# Patient Record
Sex: FEMALE | Race: ASIAN
[De-identification: names, ages, dates, MRNs, and addresses within clinical notes are randomized per-mention and may not be internally consistent; named-entity substitution may affect disease eponyms.]

---

## 2017-07-20 ENCOUNTER — RESULT REVIEW (OUTPATIENT)
Age: 52
End: 2017-07-20

## 2017-09-07 ENCOUNTER — APPOINTMENT (OUTPATIENT)
Dept: SURGICAL ONCOLOGY | Facility: CLINIC | Age: 52
End: 2017-09-07
Payer: COMMERCIAL

## 2017-09-07 VITALS
SYSTOLIC BLOOD PRESSURE: 149 MMHG | WEIGHT: 154 LBS | OXYGEN SATURATION: 98 % | HEART RATE: 72 BPM | HEIGHT: 63.5 IN | RESPIRATION RATE: 14 BRPM | DIASTOLIC BLOOD PRESSURE: 100 MMHG | BODY MASS INDEX: 26.95 KG/M2

## 2017-09-07 DIAGNOSIS — Z80.3 FAMILY HISTORY OF MALIGNANT NEOPLASM OF BREAST: ICD-10-CM

## 2017-09-07 DIAGNOSIS — Z86.79 PERSONAL HISTORY OF OTHER DISEASES OF THE CIRCULATORY SYSTEM: ICD-10-CM

## 2017-09-07 PROBLEM — Z00.00 ENCOUNTER FOR PREVENTIVE HEALTH EXAMINATION: Status: ACTIVE | Noted: 2017-09-07

## 2017-09-07 PROCEDURE — 99245 OFF/OP CONSLTJ NEW/EST HI 55: CPT

## 2017-09-07 RX ORDER — LEVOTHYROXINE SODIUM 88 UG/1
88 TABLET ORAL
Refills: 0 | Status: ACTIVE | COMMUNITY

## 2017-09-07 RX ORDER — CLOTRIMAZOLE AND BETAMETHASONE DIPROPIONATE 10; .5 MG/G; MG/G
1-0.05 CREAM TOPICAL
Qty: 90 | Refills: 0 | Status: ACTIVE | COMMUNITY
Start: 2017-05-10

## 2017-09-07 RX ORDER — MULTIVITAMIN
TABLET ORAL
Refills: 0 | Status: ACTIVE | COMMUNITY

## 2017-09-07 RX ORDER — AMLODIPINE BESYLATE 5 MG/1
5 TABLET ORAL
Refills: 0 | Status: ACTIVE | COMMUNITY

## 2017-09-07 RX ORDER — AMLODIPINE BESYLATE 5 MG/1
5 TABLET ORAL
Qty: 90 | Refills: 0 | Status: ACTIVE | COMMUNITY
Start: 2017-03-31

## 2017-09-07 RX ORDER — LEVOTHYROXINE SODIUM 88 UG/1
88 TABLET ORAL
Qty: 30 | Refills: 0 | Status: ACTIVE | COMMUNITY
Start: 2017-07-13

## 2017-09-12 ENCOUNTER — OUTPATIENT (OUTPATIENT)
Dept: OUTPATIENT SERVICES | Facility: HOSPITAL | Age: 52
LOS: 1 days | End: 2017-09-12

## 2017-09-12 DIAGNOSIS — Z00.8 ENCOUNTER FOR OTHER GENERAL EXAMINATION: ICD-10-CM

## 2017-09-19 ENCOUNTER — FORM ENCOUNTER (OUTPATIENT)
Age: 52
End: 2017-09-19

## 2017-09-20 ENCOUNTER — OUTPATIENT (OUTPATIENT)
Dept: OUTPATIENT SERVICES | Facility: HOSPITAL | Age: 52
LOS: 1 days | End: 2017-09-20
Payer: SELF-PAY

## 2017-09-20 ENCOUNTER — OUTPATIENT (OUTPATIENT)
Dept: OUTPATIENT SERVICES | Facility: HOSPITAL | Age: 52
LOS: 1 days | End: 2017-09-20
Payer: COMMERCIAL

## 2017-09-20 VITALS
DIASTOLIC BLOOD PRESSURE: 94 MMHG | TEMPERATURE: 99 F | RESPIRATION RATE: 16 BRPM | HEIGHT: 63.5 IN | HEART RATE: 65 BPM | SYSTOLIC BLOOD PRESSURE: 140 MMHG | WEIGHT: 154.1 LBS

## 2017-09-20 DIAGNOSIS — I10 ESSENTIAL (PRIMARY) HYPERTENSION: ICD-10-CM

## 2017-09-20 DIAGNOSIS — E04.1 NONTOXIC SINGLE THYROID NODULE: ICD-10-CM

## 2017-09-20 DIAGNOSIS — E07.9 DISORDER OF THYROID, UNSPECIFIED: Chronic | ICD-10-CM

## 2017-09-20 DIAGNOSIS — E07.9 DISORDER OF THYROID, UNSPECIFIED: ICD-10-CM

## 2017-09-20 DIAGNOSIS — N63 UNSPECIFIED LUMP IN BREAST: Chronic | ICD-10-CM

## 2017-09-20 DIAGNOSIS — R06.83 SNORING: ICD-10-CM

## 2017-09-20 DIAGNOSIS — C73 MALIGNANT NEOPLASM OF THYROID GLAND: ICD-10-CM

## 2017-09-20 LAB
BUN SERPL-MCNC: 10 MG/DL — SIGNIFICANT CHANGE UP (ref 7–23)
CALCIUM SERPL-MCNC: 8.8 MG/DL — SIGNIFICANT CHANGE UP (ref 8.4–10.5)
CHLORIDE SERPL-SCNC: 101 MMOL/L — SIGNIFICANT CHANGE UP (ref 98–107)
CO2 SERPL-SCNC: 25 MMOL/L — SIGNIFICANT CHANGE UP (ref 22–31)
CREAT SERPL-MCNC: 0.7 MG/DL — SIGNIFICANT CHANGE UP (ref 0.5–1.3)
GLUCOSE SERPL-MCNC: 98 MG/DL — SIGNIFICANT CHANGE UP (ref 70–99)
HCT VFR BLD CALC: 41.3 % — SIGNIFICANT CHANGE UP (ref 34.5–45)
HGB BLD-MCNC: 13.8 G/DL — SIGNIFICANT CHANGE UP (ref 11.5–15.5)
MCHC RBC-ENTMCNC: 29.4 PG — SIGNIFICANT CHANGE UP (ref 27–34)
MCHC RBC-ENTMCNC: 33.4 % — SIGNIFICANT CHANGE UP (ref 32–36)
MCV RBC AUTO: 88.1 FL — SIGNIFICANT CHANGE UP (ref 80–100)
NRBC # FLD: 0 — SIGNIFICANT CHANGE UP
PLATELET # BLD AUTO: 177 K/UL — SIGNIFICANT CHANGE UP (ref 150–400)
PMV BLD: 10 FL — SIGNIFICANT CHANGE UP (ref 7–13)
POTASSIUM SERPL-MCNC: 4 MMOL/L — SIGNIFICANT CHANGE UP (ref 3.5–5.3)
POTASSIUM SERPL-SCNC: 4 MMOL/L — SIGNIFICANT CHANGE UP (ref 3.5–5.3)
RBC # BLD: 4.69 M/UL — SIGNIFICANT CHANGE UP (ref 3.8–5.2)
RBC # FLD: 12.7 % — SIGNIFICANT CHANGE UP (ref 10.3–14.5)
SODIUM SERPL-SCNC: 137 MMOL/L — SIGNIFICANT CHANGE UP (ref 135–145)
WBC # BLD: 6.01 K/UL — SIGNIFICANT CHANGE UP (ref 3.8–10.5)
WBC # FLD AUTO: 6.01 K/UL — SIGNIFICANT CHANGE UP (ref 3.8–10.5)

## 2017-09-20 PROCEDURE — 88321 CONSLTJ&REPRT SLD PREP ELSWR: CPT

## 2017-09-20 PROCEDURE — 93010 ELECTROCARDIOGRAM REPORT: CPT

## 2017-09-20 PROCEDURE — 71020: CPT | Mod: 26

## 2017-09-20 NOTE — H&P PST ADULT - SOURCE OF INFORMATION, PROFILE
cell [phone]; home [phone]; authorized spouse, Francisco [phone] to receive information/patient

## 2017-09-20 NOTE — H&P PST ADULT - CARDIOVASCULAR COMMENTS
c/o palpitations as recent as this week -- to see pcp for evaluation c/o lightheadedness as recent as this am; c/o palpitations as recent as this week -- to see pcp for evaluation

## 2017-09-20 NOTE — H&P PST ADULT - PROBLEM SELECTOR PLAN 1
This is a  51 y/o female who is scheduled for left thyroid lobectomy, possible total thyroidectomy on 89-29-17  * Given scrub cleanser  * Given pre op Famotidine  * Instructed to take normal am dose of synthroid the am of surgery This is a  51 y/o female who is scheduled for left thyroid lobectomy, possible total thyroidectomy on 9-29-17  * Given scrub cleanser  * Given pre op Famotidine  * Instructed to take normal am dose of synthroid the am of surgery

## 2017-09-20 NOTE — H&P PST ADULT - HISTORY OF PRESENT ILLNESS
This is a 51 y/o female who presents with h/o known left thyroid nodule for many years. Had two prior negative biopsies in the last 10 years. Had recent biopsy revealed "suspicious cells." Intervention recommended. Scheduled for left thyroid lobectomy, possible  total thyroidectomy on 9-29-17 This is a 53 y/o female who presents with h/o known left thyroid nodule for many years. Had two prior negative biopsies in the last 10 years. Had recent biopsy revealed "suspicious cells." Intervention recommended. Scheduled for left thyroid lobectomy, possible  total thyroidectomy on 9-29-17  (NOTE: Patient has h/o hyperthyroidism and known left thyroid nodule. Has received radioactive iodine. Post therapy, developed hypothyroidism).

## 2017-09-20 NOTE — H&P PST ADULT - PROBLEM SELECTOR PLAN 3
Await medical clearance from pcp due to elevated BP at PAST office despite taking antihypertensives   * Need to notify surgeon of pre op medical clearnace Await medical clearance from pcp due to elevated BP at PAST office despite taking antihypertensives and recent c/o lightheadedness as recent as this am  * Need to notify surgeon of pre op medical clearnace Await medical clearance from pcp due to elevated BP at PAST office despite taking antihypertensives and recent c/o lightheadedness as recent as this am  * Need to notify surgeon of pre op medical clearance--spoke to Jaren in surgeon's office Await medical clearance from pcp due to elevated BP at PAST office despite taking antihypertensives and recent c/o lightheadedness as recent as this am, and recent palpitations as recent as this week.  * Need to notify surgeon of pre op medical clearance--spoke to Jaren in surgeon's office

## 2017-09-20 NOTE — H&P PST ADULT - MAMMOGRAM, RESULTS OF LAST, PROFILE
has right breast marker; recent letter states she needs to follow up has right breast marker; As per patient, she received recent letter from MD that states she needs to follow up

## 2017-09-20 NOTE — H&P PST ADULT - PSH
Breast mass, right  marker in place  Thyroid mass  biopsy -- 'suspicious cells' in August/Sept 2017 and two negative biopsies within last 10 years

## 2017-09-20 NOTE — H&P PST ADULT - NSANTHOSAYNRD_GEN_A_CORE
No. MAURICIO screening performed.  STOP BANG Legend: 0-2 = LOW Risk; 3-4 = INTERMEDIATE Risk; 5-8 = HIGH Risk

## 2017-09-20 NOTE — H&P PST ADULT - PMH
Hypertension    Hyperthyroidism  had received radioactive iodine. s/p developed hypothyroidism-- NOTE: left thyroid mass present at this time with negative biopsies  Thyroid mass  since approximately 2007 -- had two prior negative biopsies. Last biopsy in Aug/Sept 2017 "suspicious cells" Breast mass, right  marker in place  Hypertension    Hyperthyroidism  had received radioactive iodine. s/p developed hypothyroidism-- NOTE: left thyroid mass present at this time with negative biopsies  Snoring  MAURICIO precautions -- responds affirmatively to STOP BANG questionnaire -- admits to loud snoring; age > 50; h/o htn  Thyroid mass  since approximately 2007 -- had two prior negative biopsies. Last biopsy in Aug/Sept 2017 "suspicious cells"  Varicose veins  bilateral

## 2017-09-29 ENCOUNTER — OUTPATIENT (OUTPATIENT)
Dept: OUTPATIENT SERVICES | Facility: HOSPITAL | Age: 52
LOS: 1 days | Discharge: HOME | End: 2017-09-29
Payer: COMMERCIAL

## 2017-09-29 ENCOUNTER — RESULT REVIEW (OUTPATIENT)
Age: 52
End: 2017-09-29

## 2017-09-29 ENCOUNTER — APPOINTMENT (OUTPATIENT)
Dept: SURGICAL ONCOLOGY | Facility: HOSPITAL | Age: 52
End: 2017-09-29

## 2017-09-29 VITALS
RESPIRATION RATE: 16 BRPM | HEART RATE: 78 BPM | OXYGEN SATURATION: 98 % | DIASTOLIC BLOOD PRESSURE: 94 MMHG | WEIGHT: 154.1 LBS | SYSTOLIC BLOOD PRESSURE: 161 MMHG | HEIGHT: 63 IN | TEMPERATURE: 98 F

## 2017-09-29 VITALS
OXYGEN SATURATION: 100 % | HEART RATE: 79 BPM | DIASTOLIC BLOOD PRESSURE: 62 MMHG | SYSTOLIC BLOOD PRESSURE: 122 MMHG | RESPIRATION RATE: 16 BRPM

## 2017-09-29 DIAGNOSIS — E04.1 NONTOXIC SINGLE THYROID NODULE: ICD-10-CM

## 2017-09-29 DIAGNOSIS — E07.9 DISORDER OF THYROID, UNSPECIFIED: Chronic | ICD-10-CM

## 2017-09-29 DIAGNOSIS — N63 UNSPECIFIED LUMP IN BREAST: Chronic | ICD-10-CM

## 2017-09-29 LAB — HCG UR QL: NEGATIVE — SIGNIFICANT CHANGE UP

## 2017-09-29 PROCEDURE — 60220 PARTIAL REMOVAL OF THYROID: CPT

## 2017-09-29 PROCEDURE — 88307 TISSUE EXAM BY PATHOLOGIST: CPT | Mod: 26

## 2017-09-29 PROCEDURE — 88331 PATH CONSLTJ SURG 1 BLK 1SPC: CPT | Mod: 26

## 2017-09-29 RX ORDER — FENTANYL CITRATE 50 UG/ML
25 INJECTION INTRAVENOUS
Qty: 0 | Refills: 0 | Status: DISCONTINUED | OUTPATIENT
Start: 2017-09-29 | End: 2017-09-29

## 2017-09-29 RX ORDER — ONDANSETRON 8 MG/1
4 TABLET, FILM COATED ORAL ONCE
Qty: 0 | Refills: 0 | Status: DISCONTINUED | OUTPATIENT
Start: 2017-09-29 | End: 2017-10-14

## 2017-09-29 RX ORDER — SODIUM CHLORIDE 9 MG/ML
1000 INJECTION, SOLUTION INTRAVENOUS
Qty: 0 | Refills: 0 | Status: DISCONTINUED | OUTPATIENT
Start: 2017-09-29 | End: 2017-10-14

## 2017-09-29 RX ORDER — SODIUM CHLORIDE 9 MG/ML
1000 INJECTION, SOLUTION INTRAVENOUS
Qty: 0 | Refills: 0 | Status: DISCONTINUED | OUTPATIENT
Start: 2017-09-29 | End: 2017-09-29

## 2017-09-29 RX ORDER — LEVOTHYROXINE SODIUM 125 MCG
1 TABLET ORAL
Qty: 0 | Refills: 0 | COMMUNITY

## 2017-09-29 RX ORDER — FENTANYL CITRATE 50 UG/ML
50 INJECTION INTRAVENOUS
Qty: 0 | Refills: 0 | Status: DISCONTINUED | OUTPATIENT
Start: 2017-09-29 | End: 2017-09-29

## 2017-09-29 RX ORDER — AMLODIPINE BESYLATE 2.5 MG/1
1 TABLET ORAL
Qty: 0 | Refills: 0 | COMMUNITY

## 2017-09-29 NOTE — ASU DISCHARGE PLAN (ADULT/PEDIATRIC). - MEDICATION SUMMARY - MEDICATIONS TO TAKE
I will START or STAY ON the medications listed below when I get home from the hospital:    amLODIPine 5 mg oral tablet  -- 1 tab(s) by mouth once a day at night  -- Indication: For BP    Synthroid 88 mcg (0.088 mg) oral tablet  -- 1 tab(s) by mouth once a day in the morning  -- Indication: For thyroid

## 2017-09-29 NOTE — PROGRESS NOTE ADULT - SUBJECTIVE AND OBJECTIVE BOX
Surgery Pre-op Note    History  Ms. Meng is a 59F with a history of a left lobe thyroid nodule, first identified 10 years ago, who has previously had 2 negative biopsies. She presents after a recent biopsy that showed suspicious cells, with both follicular and papillary features.     Vital Signs Last 24 Hrs  T(C): 36.9 (29 Sep 2017 06:56), Max: 36.9 (29 Sep 2017 06:56)  T(F): 98.5 (29 Sep 2017 06:56), Max: 98.5 (29 Sep 2017 06:56)  HR: 78 (29 Sep 2017 06:56) (78 - 78)  BP: 161/94 (29 Sep 2017 06:56) (161/94 - 161/94)  BP(mean): --  RR: 16 (29 Sep 2017 06:56) (16 - 16)  SpO2: 98% (29 Sep 2017 06:56) (98% - 98%)      Assessment:  59F with a known left lobe thyroid nodule measuring 1.9cm, with recent FNA showing suspicious cells, here for left thyroid lobectomy with possible total thyroidectomy.    Plan:  - Left partial thyroidectomy  - Intra-op frozen section  - Possible total thyroidectomy

## 2017-09-29 NOTE — ASU DISCHARGE PLAN (ADULT/PEDIATRIC). - NOTIFY
Numbness, tingling/Increased Irritability or Sluggishness/Unable to Urinate/Fever greater than 101/Inability to Tolerate Liquids or Foods/Excessive Diarrhea/Persistent Nausea and Vomiting/Pain not relieved by Medications/Bleeding that does not stop/Swelling that continues/Numbness, color, or temperature change to extremity

## 2017-09-29 NOTE — ASU DISCHARGE PLAN (ADULT/PEDIATRIC). - ***IN THE EVENT THAT YOU DEVELOP A COMPLICATION AND YOU ARE UNABLE TO REACH YOUR OWN PHYSICIAN, YOU MAY CONTACT:
at bedside to try for labs  Attempted 3 times but was unsuccessful  Dr Rubio Memory made aware        Akash Burnett, RN  04/18/17 0620
Unable to obtain any labs from pt after 6 attempts  Made Dr Hesham Duong aware and requested help for a femoral stick or central line  Called lab to see if  can attempt straight stick        Domo Bliss RN  04/18/17 [id number]
Updates called to Lakia Kirkland, RN in ICU       Wolfgang Xavier RN  04/18/17 [id number]
Statement Selected

## 2017-09-29 NOTE — BRIEF OPERATIVE NOTE - PROCEDURE
<<-----Click on this checkbox to enter Procedure Thyroid lobectomy, left  09/29/2017    Active  MBEG

## 2017-09-30 ENCOUNTER — INPATIENT (INPATIENT)
Facility: HOSPITAL | Age: 52
LOS: 2 days | Discharge: HOME | DRG: 909 | End: 2017-10-03
Attending: SURGERY | Admitting: SURGERY
Payer: COMMERCIAL

## 2017-09-30 ENCOUNTER — TRANSCRIPTION ENCOUNTER (OUTPATIENT)
Age: 52
End: 2017-09-30

## 2017-09-30 VITALS
RESPIRATION RATE: 16 BRPM | TEMPERATURE: 98 F | SYSTOLIC BLOOD PRESSURE: 165 MMHG | OXYGEN SATURATION: 97 % | HEART RATE: 74 BPM | DIASTOLIC BLOOD PRESSURE: 92 MMHG | WEIGHT: 149.91 LBS | HEIGHT: 63 IN

## 2017-09-30 DIAGNOSIS — M54.2 CERVICALGIA: ICD-10-CM

## 2017-09-30 DIAGNOSIS — N63 UNSPECIFIED LUMP IN BREAST: Chronic | ICD-10-CM

## 2017-09-30 DIAGNOSIS — E07.9 DISORDER OF THYROID, UNSPECIFIED: Chronic | ICD-10-CM

## 2017-09-30 LAB
ALBUMIN SERPL ELPH-MCNC: 4.4 G/DL — SIGNIFICANT CHANGE UP (ref 3.3–5.2)
ALP SERPL-CCNC: 68 U/L — SIGNIFICANT CHANGE UP (ref 40–120)
ALT FLD-CCNC: 10 U/L — SIGNIFICANT CHANGE UP
ANION GAP SERPL CALC-SCNC: 13 MMOL/L — SIGNIFICANT CHANGE UP (ref 5–17)
AST SERPL-CCNC: 13 U/L — SIGNIFICANT CHANGE UP
BILIRUB SERPL-MCNC: 0.4 MG/DL — SIGNIFICANT CHANGE UP (ref 0.4–2)
BLD GP AB SCN SERPL QL: SIGNIFICANT CHANGE UP
BUN SERPL-MCNC: 14 MG/DL — SIGNIFICANT CHANGE UP (ref 8–20)
CALCIUM SERPL-MCNC: 8.8 MG/DL — SIGNIFICANT CHANGE UP (ref 8.6–10.2)
CHLORIDE SERPL-SCNC: 101 MMOL/L — SIGNIFICANT CHANGE UP (ref 98–107)
CO2 SERPL-SCNC: 25 MMOL/L — SIGNIFICANT CHANGE UP (ref 22–29)
CREAT SERPL-MCNC: 0.66 MG/DL — SIGNIFICANT CHANGE UP (ref 0.5–1.3)
GLUCOSE SERPL-MCNC: 107 MG/DL — SIGNIFICANT CHANGE UP (ref 70–115)
HCT VFR BLD CALC: 39.9 % — SIGNIFICANT CHANGE UP (ref 37–47)
HGB BLD-MCNC: 13.7 G/DL — SIGNIFICANT CHANGE UP (ref 12–16)
LACTATE BLDV-MCNC: 1.2 MMOL/L — SIGNIFICANT CHANGE UP (ref 0.5–2)
MCHC RBC-ENTMCNC: 30.2 PG — SIGNIFICANT CHANGE UP (ref 27–31)
MCHC RBC-ENTMCNC: 34.3 G/DL — SIGNIFICANT CHANGE UP (ref 32–36)
MCV RBC AUTO: 88.1 FL — SIGNIFICANT CHANGE UP (ref 81–99)
PLATELET # BLD AUTO: 217 K/UL — SIGNIFICANT CHANGE UP (ref 150–400)
POTASSIUM SERPL-MCNC: 3.9 MMOL/L — SIGNIFICANT CHANGE UP (ref 3.5–5.3)
POTASSIUM SERPL-SCNC: 3.9 MMOL/L — SIGNIFICANT CHANGE UP (ref 3.5–5.3)
PROT SERPL-MCNC: 7.9 G/DL — SIGNIFICANT CHANGE UP (ref 6.6–8.7)
RBC # BLD: 4.53 M/UL — SIGNIFICANT CHANGE UP (ref 4.4–5.2)
RBC # FLD: 13.3 % — SIGNIFICANT CHANGE UP (ref 11–15.6)
SODIUM SERPL-SCNC: 139 MMOL/L — SIGNIFICANT CHANGE UP (ref 135–145)
TYPE + AB SCN PNL BLD: SIGNIFICANT CHANGE UP
WBC # BLD: 11.5 K/UL — HIGH (ref 4.8–10.8)
WBC # FLD AUTO: 11.5 K/UL — HIGH (ref 4.8–10.8)

## 2017-09-30 PROCEDURE — 99285 EMERGENCY DEPT VISIT HI MDM: CPT

## 2017-09-30 PROCEDURE — 93010 ELECTROCARDIOGRAM REPORT: CPT

## 2017-09-30 PROCEDURE — 21501 I&D DP ABSC/HMTMA SFT TS NCK: CPT

## 2017-09-30 PROCEDURE — 35800 EXPLORE NECK VESSELS: CPT | Mod: 59

## 2017-09-30 PROCEDURE — 70491 CT SOFT TISSUE NECK W/DYE: CPT | Mod: 26

## 2017-09-30 RX ORDER — LEVOTHYROXINE SODIUM 125 MCG
44 TABLET ORAL DAILY
Qty: 0 | Refills: 0 | Status: DISCONTINUED | OUTPATIENT
Start: 2017-09-30 | End: 2017-10-01

## 2017-09-30 RX ORDER — HYDROMORPHONE HYDROCHLORIDE 2 MG/ML
0.5 INJECTION INTRAMUSCULAR; INTRAVENOUS; SUBCUTANEOUS EVERY 4 HOURS
Qty: 0 | Refills: 0 | Status: DISCONTINUED | OUTPATIENT
Start: 2017-09-30 | End: 2017-10-01

## 2017-09-30 RX ORDER — DEXAMETHASONE 0.5 MG/5ML
10 ELIXIR ORAL ONCE
Qty: 0 | Refills: 0 | Status: COMPLETED | OUTPATIENT
Start: 2017-09-30 | End: 2017-09-30

## 2017-09-30 RX ORDER — SODIUM CHLORIDE 9 MG/ML
1000 INJECTION, SOLUTION INTRAVENOUS
Qty: 0 | Refills: 0 | Status: DISCONTINUED | OUTPATIENT
Start: 2017-09-30 | End: 2017-10-01

## 2017-09-30 RX ORDER — SODIUM CHLORIDE 9 MG/ML
1000 INJECTION, SOLUTION INTRAVENOUS
Qty: 0 | Refills: 0 | Status: DISCONTINUED | OUTPATIENT
Start: 2017-09-30 | End: 2017-10-02

## 2017-09-30 RX ORDER — VANCOMYCIN HCL 1 G
1000 VIAL (EA) INTRAVENOUS ONCE
Qty: 0 | Refills: 0 | Status: DISCONTINUED | OUTPATIENT
Start: 2017-09-30 | End: 2017-10-01

## 2017-09-30 RX ORDER — ONDANSETRON 8 MG/1
4 TABLET, FILM COATED ORAL ONCE
Qty: 0 | Refills: 0 | Status: DISCONTINUED | OUTPATIENT
Start: 2017-09-30 | End: 2017-10-01

## 2017-09-30 RX ORDER — ACETAMINOPHEN 500 MG
650 TABLET ORAL ONCE
Qty: 0 | Refills: 0 | Status: COMPLETED | OUTPATIENT
Start: 2017-09-30 | End: 2017-09-30

## 2017-09-30 RX ORDER — SODIUM CHLORIDE 9 MG/ML
1000 INJECTION INTRAMUSCULAR; INTRAVENOUS; SUBCUTANEOUS ONCE
Qty: 0 | Refills: 0 | Status: COMPLETED | OUTPATIENT
Start: 2017-09-30 | End: 2017-09-30

## 2017-09-30 RX ORDER — FENTANYL CITRATE 50 UG/ML
25 INJECTION INTRAVENOUS
Qty: 0 | Refills: 0 | Status: DISCONTINUED | OUTPATIENT
Start: 2017-09-30 | End: 2017-10-01

## 2017-09-30 RX ORDER — PIPERACILLIN AND TAZOBACTAM 4; .5 G/20ML; G/20ML
3.38 INJECTION, POWDER, LYOPHILIZED, FOR SOLUTION INTRAVENOUS ONCE
Qty: 0 | Refills: 0 | Status: COMPLETED | OUTPATIENT
Start: 2017-09-30 | End: 2017-09-30

## 2017-09-30 RX ADMIN — Medication 650 MILLIGRAM(S): at 17:56

## 2017-09-30 RX ADMIN — SODIUM CHLORIDE 1000 MILLILITER(S): 9 INJECTION INTRAMUSCULAR; INTRAVENOUS; SUBCUTANEOUS at 16:40

## 2017-09-30 RX ADMIN — Medication 10 MILLIGRAM(S): at 16:40

## 2017-09-30 RX ADMIN — SODIUM CHLORIDE 100 MILLILITER(S): 9 INJECTION, SOLUTION INTRAVENOUS at 22:45

## 2017-09-30 RX ADMIN — PIPERACILLIN AND TAZOBACTAM 200 GRAM(S): 4; .5 INJECTION, POWDER, LYOPHILIZED, FOR SOLUTION INTRAVENOUS at 18:37

## 2017-09-30 NOTE — H&P ADULT - ASSESSMENT
53 y/o F s/p left thyroidectomy POD 1 at [facility] for thyroid neoplasm with hematoma and active bleeding   - Admit to ACS  - NPO, IVF  - Pre-op labs type and screen  - OR for neck exploration  - Pain control

## 2017-09-30 NOTE — ED ADULT NURSE NOTE - EENT WDL
Eyes with no visual disturbances.  Ears clean and dry and no hearing difficulties. Nose with pink mucosa and no drainage.  Mouth mucous membranes moist and pink. s/p thyroidectomy, well healing wound

## 2017-09-30 NOTE — ED ADULT NURSE REASSESSMENT NOTE - NS ED NURSE REASSESS COMMENT FT1
Report given to OR AQUILES rowley pt made aware of plan of care. Please refer to flowsheet for last set of vitals

## 2017-09-30 NOTE — ED PROVIDER NOTE - MEDICAL DECISION MAKING DETAILS
Pt s/p left thyroidectomy yesterday presents with post op pain and some numbness. Pain much improved. Will check labs. Pt s/p left thyroidectomy yesterday presents with post op pain and some numbness. Pain much improved. Will check labs and call her surgeon.

## 2017-09-30 NOTE — BRIEF OPERATIVE NOTE - PROCEDURE
<<-----Click on this checkbox to enter Procedure Evacuation of hematoma of head and neck  09/30/2017  with washout  Active  LIUDMILA Evacuation of hematoma of head and neck  09/30/2017  with washout and drain placement  Active  Shama Euceda

## 2017-09-30 NOTE — ED PROVIDER NOTE - PROGRESS NOTE DETAILS
Received call from [facility] that pt has post op abscesses. Started antibiotics. Pt has increased fullness in her neck. Placed multiple calls to her surgeon Dr. Humberto Skelton, awaiting call back from Dr. De Jesus who is covering. Placed call to cardiothoracic surgery as well as ENT. Spoke to Dr. Vieyra ENT who reviewed the CT and thought likely she has a hematoma with active extravasation. The pockets of air could be esophageal injury vs post op changes? Spoke to general surgery Dr. Cowart who is evaluating pt at bedside. Pt taken to OR. 1600 pt complaints of increasing pain. Will get CT of neck with contrast.  IV decadron given.

## 2017-09-30 NOTE — CONSULT NOTE ADULT - PROBLEM SELECTOR RECOMMENDATION 9
to OR with ACS for urgent evacuation, exploration, hemostasis  may need prophylactic intubation prior to OR for worsening respiratory distress  D/w ACS attending, states no need for assistance from CT surgery at this time  Please reconsult CTS if needed

## 2017-09-30 NOTE — ED PROVIDER NOTE - OBJECTIVE STATEMENT
Pt s/p left thyroid lobectomy yesterday comes to the ED with complaints of pain on left side of neck and numbness to the left side of the lips that has resolved. no fever. No difficulty swallowing. She has hx of left thyroid nodule for many years that had 2 prior negative biopsies. The most recent one showed suspicious cells. Also hx of hyperthyroidism who received radioactive iodine and then developed hypothyroidism and takes synthroid.  Denies fever or change in vision. Pt s/p left thyroid lobectomy yesterday comes to the ED with complaints of pain on left side of neck and numbness to the left side of the lips that has resolved after she took tylenol no fever. No difficulty swallowing. She has hx of left thyroid nodule for many years that had 2 prior negative biopsies. The most recent one showed suspicious cells. Also hx of hyperthyroidism who received radioactive iodine and then developed hypothyroidism and takes synthroid.  Denies fever or change in vision. Pt s/p left thyroid lobectomy yesterday comes to the ED with complaints of pain on left side of neck and numbness to the left side of the lips that has resolved after she took tylenol no fever. No difficulty swallowing. She has hx of left thyroid nodule for many years that had 2 prior negative biopsies. The most recent one showed suspicious cells resulting in her left thyroidectomy. Also hx of hyperthyroidism who received radioactive iodine and then developed hypothyroidism and takes synthroid.  Denies fever or change in vision. No difficulty swollowing.

## 2017-09-30 NOTE — ED PROVIDER NOTE - NS ED ROS FT
Review of Systems:  	•	CONSTITUTIONAL : no fever or weight change  	•	SKIN : steri strips on neck  	•	HEMATOLOGIC : no petechia, no bruising  	•	EYES : no eye pain, no blurred vision  	•	ENT : no change in hearing, no sore throat  	•	RESPIRATORY : no shortness of breath, no cough  	•	CARDIAC : no chest pain, no palpitations  	•	GI : no abd pain, no nausea, no vomiting, no diarrhea, no constipation, no bleeding   	•	MUSCULOSKELETAL : no joint paint, no swelling, no redness  	•	NEUROLOGIC : some numbness to left perioral region Review of Systems:  	•	CONSTITUTIONAL : no fever or weight change  	•	SKIN : steri strips on neck  	•	HEMATOLOGIC : no petechia, no bruising  	•	EYES : no eye pain, no blurred vision  	•	ENT : no change in hearing, no sore throat  	•	RESPIRATORY : no shortness of breath, no cough  	•	CARDIAC : no chest pain, no palpitations  	•	GI : no abd pain, no nausea, no vomiting, no diarrhea, no constipation, no bleeding   	•	Neck: + left sided neck pain  	•	NEUROLOGIC : some numbness to left perioral region

## 2017-09-30 NOTE — ED PROVIDER NOTE - CARE PLAN
Principal Discharge DX:	Neck pain, acute Principal Discharge DX:	Hematoma of neck, initial encounter

## 2017-09-30 NOTE — BRIEF OPERATIVE NOTE - OPERATION/FINDINGS
large hematoma, No signs of active bleeding large hematoma/clot evacuated. No identifiable source of active bleeding requiring ligation.

## 2017-09-30 NOTE — ED ADULT NURSE NOTE - OBJECTIVE STATEMENT
pt received in a 16 r. pt is awake alert oriented following commands and speaking coherently. pt rpesents to er complaining of an episode of facial pain that radiated from her cheek to her throat. she is s/p throidectomy yesterday at [facility] for nodules. she said surgery went well and has been taking tylenol for pain. took tylenol prior to arrival, her pain has since subsided. no stridor, lung sounds cta bl, resp even and unlabored. denies nausea vomiting fever chills chest pain sob headache and urinary problems.

## 2017-09-30 NOTE — H&P ADULT - HISTORY OF PRESENT ILLNESS
GENERAL SURGERY CONSULT NOTE    FROM:   FOR:   RFC:    HPI:  53 y/o F s/p left thyroid lobectomy POD 1 at [facility] presents to the ED with complaints of pain on left side of neck and numbness to the left side of the lips that has resolved. no fever. Pt complains of difficulty swallowing, that is getting progressively worse as well as throat tightness, which she states has become progressively worse while in the ED.  She has hx of left thyroid nodule for many years that had 2 prior negative biopsies. The most recent one showed suspicious cells. Also hx of hyperthyroidism who received radioactive iodine and then developed hypothyroidism and takes synthroid.  Denies fever, chills, chest pain, nausea, emesis, changes in bowel habits, changes in urination, changes in vision, recent weight loss.     CT of neck: shows hematoma with active bleeding, and subcutaneous air     CURRENT MEDICAL PROBLEMS:      PAST MEDICAL HISTORY:  Varicose veins  Snoring  Breast mass, right  Thyroid mass  Hyperthyroidism  Hypertension      SURGICAL HISTORY:  Breast mass, right  Thyroid mass          MEDICATIONS  (STANDING):  vancomycin  IVPB 1000 milliGRAM(s) IV Intermittent once    MEDICATIONS  (PRN):      Allergies    No Known Allergies    Intolerances        SOCIAL HISTORY:    FAMILY HISTORY:  Family history of hypertension in father (Father)      Vital Signs Last 24 Hrs  T(C): 36.7 (30 Sep 2017 19:37), Max: 36.8 (30 Sep 2017 14:20)  T(F): 98 (30 Sep 2017 19:37), Max: 98.3 (30 Sep 2017 14:20)  HR: 77 (30 Sep 2017 19:37) (74 - 77)  BP: 154/74 (30 Sep 2017 19:37) (154/74 - 165/92)  BP(mean): --  RR: 17 (30 Sep 2017 19:37) (16 - 17)  SpO2: 100% (30 Sep 2017 19:37) (97% - 100%)  LABS:                        13.7   11.5  )-----------( 217      ( 30 Sep 2017 15:30 )             39.9     09-30    139  |  101  |  14.0  ----------------------------<  107  3.9   |  25.0  |  0.66    Ca    8.8      30 Sep 2017 15:07    TPro  7.9  /  Alb  4.4  /  TBili  0.4  /  DBili  x   /  AST  13  /  ALT  10  /  AlkPhos  68  09-30              RADIOLOGY & ADDITIONAL STUDIES:

## 2017-09-30 NOTE — ED ADULT TRIAGE NOTE - CHIEF COMPLAINT QUOTE
pt reports left sided facial pain radiating down the left sided of her neck, numbness to lips, s/p thyroidectomy yesterday. no other complaints.

## 2017-09-30 NOTE — H&P ADULT - NSHPPHYSICALEXAM_GEN_ALL_CORE
PHYSICAL EXAM:    GENERAL: NAD, well-groomed, well-developed  HEAD:  Atraumatic, Normocephalic  EYES: EOMI, PERRLA, conjunctiva and sclera clear  ENMT: incision across anterior medial neck that is well approximated, induration to surgical site. no fluctuance. TTP. Edema to the throat, unable to determine tracheal deviation.    NECK: Supple, No JVD, Normal thyroid  NERVOUS SYSTEM:  Alert & Oriented X3, Good concentration; Motor Strength 5/5 B/L upper and lower extremities; DTRs 2+ intact and symmetric  CHEST/LUNG: Clear to percussion bilaterally; No rales, rhonchi, wheezing, or rubs  HEART: Regular rate and rhythm; No murmurs, rubs, or gallops  ABDOMEN: Soft, Nontender, Nondistended; Bowel sounds present  EXTREMITIES:  2+ Peripheral Pulses, No clubbing, cyanosis, or edema  LYMPH: No lymphadenopathy noted  SKIN: No rashes or lesions

## 2017-09-30 NOTE — ED PROVIDER NOTE - MUSCULOSKELETAL, MLM
Spine appears normal, range of motion is not limited, no muscle or joint tenderness moving all joints, no redness or swelling.

## 2017-09-30 NOTE — H&P ADULT - NSHPREVIEWOFSYSTEMS_GEN_ALL_CORE
REVIEW OF SYSTEMS:    CONSTITUTIONAL: No fever, weight loss, or fatigue  ENMT:  No difficulty hearing, tinnitus, vertigo; No sinus or throat pain  NECK:  Pain to anterior neck and stiffness, with difficulty swallowing  BREASTS: No pain, masses, or nipple discharge  RESPIRATORY: Difficulty breath due to throat tightness No cough, wheezing, chills or hemoptysis; No shortness of breath  CARDIOVASCULAR: No chest pain, palpitations, dizziness, or leg swelling  GASTROINTESTINAL: Dysphagia No abdominal or epigastric pain. No nausea, vomiting, or hematemesis; No diarrhea or constipation. No melena or hematochezia.  GENITOURINARY: No dysuria, frequency, hematuria, or incontinence  NEUROLOGICAL: No headaches, memory loss, loss of strength, numbness, or tremors  SKIN: No itching, burning, rashes, or lesions   MUSCULOSKELETAL: No joint pain or swelling; No muscle, back, or extremity pain  ALLERGY AND IMMUNOLOGIC: No hives or eczema

## 2017-09-30 NOTE — ED PROVIDER NOTE - PMH
Breast mass, right  marker in place  Hypertension    Hyperthyroidism  had received radioactive iodine. s/p developed hypothyroidism-- NOTE: left thyroid mass present at this time with negative biopsies  Snoring  MAURICIO precautions -- responds affirmatively to STOP BANG questionnaire -- admits to loud snoring; age > 50; h/o htn  Thyroid mass  since approximately 2007 -- had two prior negative biopsies. Last biopsy in Aug/Sept 2017 "suspicious cells"  Varicose veins  bilateral

## 2017-09-30 NOTE — H&P ADULT - ATTENDING COMMENTS
52 female POD 1 from left lobe thyroidectomy for follicular CA presenting to ED due to increasing swelling, difficulty swallowing, and worsening ability to handle oral secretions. AVSS, collar incision clean and intact. Extensive swelling to left side of neck. CT shows large hematoma with extrav.    Discussed with patient regarding emergent need for hematoma evacuation and to attempt to identify potential source of bleeding before airway compromise. Risks and benefits discussed. Patient consented for neck exploration, hematoma evacuation, and to obtain hemostasis. All questions were answered.

## 2017-09-30 NOTE — ED PROVIDER NOTE - HEME LYMPH, MLM
No adenopathy or splenomegaly. No cervical or inguinal lymphadenopathy. no petechia.  Neck: soft, no fullness. Some tenderness near surgical site. no petechia.  Neck: soft, no fullness. Some tenderness near surgical site on left.

## 2017-10-01 LAB — ABO RH CONFIRMATION: SIGNIFICANT CHANGE UP

## 2017-10-01 PROCEDURE — 99232 SBSQ HOSP IP/OBS MODERATE 35: CPT

## 2017-10-01 RX ORDER — AMLODIPINE BESYLATE 2.5 MG/1
5 TABLET ORAL DAILY
Qty: 0 | Refills: 0 | Status: DISCONTINUED | OUTPATIENT
Start: 2017-10-01 | End: 2017-10-03

## 2017-10-01 RX ORDER — OXYCODONE HYDROCHLORIDE 5 MG/1
5 TABLET ORAL EVERY 4 HOURS
Qty: 0 | Refills: 0 | Status: DISCONTINUED | OUTPATIENT
Start: 2017-10-01 | End: 2017-10-03

## 2017-10-01 RX ORDER — ACETAMINOPHEN 500 MG
650 TABLET ORAL EVERY 6 HOURS
Qty: 0 | Refills: 0 | Status: DISCONTINUED | OUTPATIENT
Start: 2017-10-01 | End: 2017-10-03

## 2017-10-01 RX ORDER — LEVOTHYROXINE SODIUM 125 MCG
88 TABLET ORAL DAILY
Qty: 0 | Refills: 0 | Status: DISCONTINUED | OUTPATIENT
Start: 2017-10-01 | End: 2017-10-03

## 2017-10-01 RX ADMIN — Medication 44 MICROGRAM(S): at 06:04

## 2017-10-01 RX ADMIN — Medication 650 MILLIGRAM(S): at 19:29

## 2017-10-01 RX ADMIN — SODIUM CHLORIDE 125 MILLILITER(S): 9 INJECTION, SOLUTION INTRAVENOUS at 06:04

## 2017-10-01 RX ADMIN — AMLODIPINE BESYLATE 5 MILLIGRAM(S): 2.5 TABLET ORAL at 17:02

## 2017-10-01 RX ADMIN — Medication 650 MILLIGRAM(S): at 20:27

## 2017-10-01 NOTE — PROGRESS NOTE ADULT - ASSESSMENT
52 year old female with neck hematoma s/p thyroidectomy and evacuation of neck hematoma doing well with no new issues. Minimal CLEMENCIA output from drain.     1. Continue to monitor   2. Will need F/U with her surgeon upon discharge

## 2017-10-02 ENCOUNTER — TRANSCRIPTION ENCOUNTER (OUTPATIENT)
Age: 52
End: 2017-10-02

## 2017-10-02 LAB
BASOPHILS # BLD AUTO: 0 K/UL — SIGNIFICANT CHANGE UP (ref 0–0.2)
BASOPHILS NFR BLD AUTO: 0.1 % — SIGNIFICANT CHANGE UP (ref 0–2)
EOSINOPHIL # BLD AUTO: 0 K/UL — SIGNIFICANT CHANGE UP (ref 0–0.5)
EOSINOPHIL NFR BLD AUTO: 0 % — SIGNIFICANT CHANGE UP (ref 0–6)
HCT VFR BLD CALC: 35.3 % — LOW (ref 37–47)
HGB BLD-MCNC: 11.7 G/DL — LOW (ref 12–16)
LYMPHOCYTES # BLD AUTO: 2.3 K/UL — SIGNIFICANT CHANGE UP (ref 1–4.8)
LYMPHOCYTES # BLD AUTO: 27.3 % — SIGNIFICANT CHANGE UP (ref 20–55)
MCHC RBC-ENTMCNC: 29.6 PG — SIGNIFICANT CHANGE UP (ref 27–31)
MCHC RBC-ENTMCNC: 33.1 G/DL — SIGNIFICANT CHANGE UP (ref 32–36)
MCV RBC AUTO: 89.4 FL — SIGNIFICANT CHANGE UP (ref 81–99)
MONOCYTES # BLD AUTO: 0.8 K/UL — SIGNIFICANT CHANGE UP (ref 0–0.8)
MONOCYTES NFR BLD AUTO: 9.2 % — SIGNIFICANT CHANGE UP (ref 3–10)
NEUTROPHILS # BLD AUTO: 5.2 K/UL — SIGNIFICANT CHANGE UP (ref 1.8–8)
NEUTROPHILS NFR BLD AUTO: 63.2 % — SIGNIFICANT CHANGE UP (ref 37–73)
PLATELET # BLD AUTO: 205 K/UL — SIGNIFICANT CHANGE UP (ref 150–400)
RBC # BLD: 3.95 M/UL — LOW (ref 4.4–5.2)
RBC # FLD: 13.5 % — SIGNIFICANT CHANGE UP (ref 11–15.6)
WBC # BLD: 8.3 K/UL — SIGNIFICANT CHANGE UP (ref 4.8–10.8)
WBC # FLD AUTO: 8.3 K/UL — SIGNIFICANT CHANGE UP (ref 4.8–10.8)

## 2017-10-02 RX ORDER — OXYCODONE HYDROCHLORIDE 5 MG/1
1 TABLET ORAL
Qty: 6 | Refills: 0 | OUTPATIENT
Start: 2017-10-02 | End: 2017-10-03

## 2017-10-02 RX ORDER — ACETAMINOPHEN 500 MG
2 TABLET ORAL
Qty: 0 | Refills: 0 | COMMUNITY
Start: 2017-10-02

## 2017-10-02 RX ADMIN — AMLODIPINE BESYLATE 5 MILLIGRAM(S): 2.5 TABLET ORAL at 08:47

## 2017-10-02 RX ADMIN — Medication 650 MILLIGRAM(S): at 08:42

## 2017-10-02 RX ADMIN — Medication 650 MILLIGRAM(S): at 09:10

## 2017-10-02 RX ADMIN — Medication 88 MICROGRAM(S): at 06:12

## 2017-10-02 NOTE — DISCHARGE NOTE ADULT - PATIENT PORTAL LINK FT
“You can access the [facility] Patient Portal, offered by [facility], by registering with the following website: [email or web address]”

## 2017-10-02 NOTE — DISCHARGE NOTE ADULT - HOSPITAL COURSE
HPI:  GENERAL SURGERY CONSULT NOTE    FROM:   FOR:   RFC:    HPI:  51 y/o F s/p left thyroid lobectomy POD 1 at [facility] presents to the ED with complaints of pain on left side of neck and numbness to the left side of the lips that has resolved. no fever. Pt complains of difficulty swallowing, that is getting progressively worse as well as throat tightness, which she states has become progressively worse while in the ED.  She has hx of left thyroid nodule for many years that had 2 prior negative biopsies. The most recent one showed suspicious cells. Also hx of hyperthyroidism who received radioactive iodine and then developed hypothyroidism and takes synthroid.  Denies fever, chills, chest pain, nausea, emesis, changes in bowel habits, changes in urination, changes in vision, recent weight loss.   CT of neck: shows hematoma with active bleeding, and subcutaneous air   PAST MEDICAL HISTORY:  Varicose veins  Snoring  Breast mass, right  Thyroid mass  Hyperthyroidism  Hypertension  SURGICAL HISTORY:  Breast mass, right  Thyroid mass    Pt was taken to the OR for an evacuation of neck hematoma with washout and drain placement.  Pt tolerated the procedure well.  Pt had resolution of dysphagia and stridor following surgery.  Pain was well controlled following surgery.  She was advanced to a regular diet which she is tolerating and plan was discussed with her to follow up with her primary surgeon Dr. Skelton after discharge. HPI: 51 y/o F s/p left thyroid lobectomy POD 1 at [facility] presents to the ED with complaints of pain on left side of neck and numbness to the left side of the lips that has resolved. no fever. Pt complains of difficulty swallowing, that is getting progressively worse as well as throat tightness, which she states has become progressively worse while in the ED.  She has hx of left thyroid nodule for many years that had 2 prior negative biopsies. The most recent one showed suspicious cells. Also hx of hyperthyroidism who received radioactive iodine and then developed hypothyroidism and takes synthroid.  Denies fever, chills, chest pain, nausea, emesis, changes in bowel habits, changes in urination, changes in vision, recent weight loss.   CT of neck: shows hematoma with active bleeding, and subcutaneous air   Please resume all home blood pressure medications at current doses, monitor blood pressure at home, and follow-up with your cardiologist and/or your primary care provider as per your usual schedule.Varicose veins  Snoring  Breast mass, right  Thyroid mass  Hyperthyroidism  Hypertension    SURGICAL HISTORY:  Breast mass, right  Thyroid mass    Pt was taken to the OR for an evacuation of neck hematoma with washout and drain placement.  Pt tolerated the procedure well.  Pt had resolution of dysphagia and stridor following surgery.  Pain was well controlled following surgery.  She was advanced to a regular diet which she is tolerating and plan was discussed with her to follow up with her primary surgeon Dr. Skelton after discharge.      Patient is advised to RETURN TO THE EMERGENCY DEPARTMENT for any of the following - worsening pain, fever/chills, nausea/vomiting, altered mental status, chest pain, shortness of breath, or any other new / worsening symptom.

## 2017-10-02 NOTE — DISCHARGE NOTE ADULT - PLAN OF CARE
Alleviation of pain and symptoms Follow up: Please call and make an appointment with the [facility] 10-14 days after discharge. Also, please call and make an appointment with your primary care physician as per your usual schedule.   Activity: Please, limit activity and rest until follow up appointment.   Diet:   Medications: Please take all home medications as prescribed by your primary care doctor. Pain medication has been prescribed for you. Please, take it as it has been prescribed, do not drive or operate heavy machinery while taking narcotics.  You are encouraged to take over-the-counter tylenol and/or ibuprofen for pain relief when you feel your pain no longer warrants the use of narcotic pain medications, however DO NOT TAKE percocet and tylenol at the same time as they contain the same active ingredient (acetaminophen). Take only percocet OR tylenol.  Wound Care: Please, keep wound site clean and dry. You may shower, but do not bathe  [sutures, staples, special instructions]   If confusion, altered mental status, fever, chest pain, shortness of breath, new or worsening [] pain, vomiting, change or worsening of medical status, please come back to the emergency room, and in case of emergency call 911. Follow up: Please call and make an appointment with Dr. Skelton (your primary surgeon) within 1 week following discharge.  Also, please call and make an appointment with your primary care physician as per your usual schedule.   Activity: Please, limit activity and rest until follow up appointment.   Diet: regular  Medications: Please take all home medications as prescribed by your primary care doctor. Pain medication has been prescribed for you. Please, take it as it has been prescribed, do not drive or operate heavy machinery while taking narcotics.  You are encouraged to take over-the-counter tylenol and/or ibuprofen for pain relief when you feel your pain no longer warrants the use of narcotic pain medications.  Wound Care: Please, keep wound site clean and dry. You may shower, but do not bathe.  Drain will remain in place.  Please keep a log of how much fluid drains each day and bring with you to your surgeon at your follow up appointment.   If confusion, altered mental status, fever, chest pain, shortness of breath, change in voice, new or worsening neck pain, vomiting, change or worsening of medical status, please come back to the emergency room, and in case of emergency call 911. Follow up: Please call and make an appointment with Dr. Skelton (your primary surgeon) within 1 week following discharge.  Also, please call and make an appointment with your primary care physician as per your usual schedule.   Activity: Please, limit activity and rest until follow up appointment.   Diet: May continue regular diet as tolerated.  Medications: Please take all home medications as prescribed by your primary care doctor. Pain medication has been prescribed for you. Please, take it as it has been prescribed, do not drive or operate heavy machinery while taking narcotics.  You are encouraged to take over-the-counter tylenol and/or ibuprofen for pain relief when you feel your pain no longer warrants the use of narcotic pain medications.  Wound Care: Please, keep wound site clean and dry. You may shower, but do not bathe.  Drain will remain in place.  Please keep a log of how much fluid drains each day and bring with you to your surgeon at your follow up appointment.   If confusion, altered mental status, fever, chest pain, shortness of breath, change in voice, new or worsening neck pain, vomiting, change or worsening of medical status, please come back to the emergency room, and in case of emergency call 911.

## 2017-10-02 NOTE — DISCHARGE NOTE ADULT - CARE PLAN
Principal Discharge DX:	Hematoma of neck, initial encounter  Goal:	Alleviation of pain and symptoms  Instructions for follow-up, activity and diet:	Follow up: Please call and make an appointment with the [facility] 10-14 days after discharge. Also, please call and make an appointment with your primary care physician as per your usual schedule.   Activity: Please, limit activity and rest until follow up appointment.   Diet:   Medications: Please take all home medications as prescribed by your primary care doctor. Pain medication has been prescribed for you. Please, take it as it has been prescribed, do not drive or operate heavy machinery while taking narcotics.  You are encouraged to take over-the-counter tylenol and/or ibuprofen for pain relief when you feel your pain no longer warrants the use of narcotic pain medications, however DO NOT TAKE percocet and tylenol at the same time as they contain the same active ingredient (acetaminophen). Take only percocet OR tylenol.  Wound Care: Please, keep wound site clean and dry. You may shower, but do not bathe  [sutures, staples, special instructions]   If confusion, altered mental status, fever, chest pain, shortness of breath, new or worsening [] pain, vomiting, change or worsening of medical status, please come back to the emergency room, and in case of emergency call 911. Principal Discharge DX:	Hematoma of neck, initial encounter  Goal:	Alleviation of pain and symptoms  Instructions for follow-up, activity and diet:	Follow up: Please call and make an appointment with Dr. Skelton (your primary surgeon) within 1 week following discharge.  Also, please call and make an appointment with your primary care physician as per your usual schedule.   Activity: Please, limit activity and rest until follow up appointment.   Diet: regular  Medications: Please take all home medications as prescribed by your primary care doctor. Pain medication has been prescribed for you. Please, take it as it has been prescribed, do not drive or operate heavy machinery while taking narcotics.  You are encouraged to take over-the-counter tylenol and/or ibuprofen for pain relief when you feel your pain no longer warrants the use of narcotic pain medications.  Wound Care: Please, keep wound site clean and dry. You may shower, but do not bathe.  Drain will remain in place.  Please keep a log of how much fluid drains each day and bring with you to your surgeon at your follow up appointment.   If confusion, altered mental status, fever, chest pain, shortness of breath, change in voice, new or worsening neck pain, vomiting, change or worsening of medical status, please come back to the emergency room, and in case of emergency call 911. Principal Discharge DX:	Hematoma of neck, initial encounter  Goal:	Alleviation of pain and symptoms  Instructions for follow-up, activity and diet:	Follow up: Please call and make an appointment with Dr. Skelton (your primary surgeon) within 1 week following discharge.  Also, please call and make an appointment with your primary care physician as per your usual schedule.   Activity: Please, limit activity and rest until follow up appointment.   Diet: May continue regular diet as tolerated.  Medications: Please take all home medications as prescribed by your primary care doctor. Pain medication has been prescribed for you. Please, take it as it has been prescribed, do not drive or operate heavy machinery while taking narcotics.  You are encouraged to take over-the-counter tylenol and/or ibuprofen for pain relief when you feel your pain no longer warrants the use of narcotic pain medications.  Wound Care: Please, keep wound site clean and dry. You may shower, but do not bathe.  Drain will remain in place.  Please keep a log of how much fluid drains each day and bring with you to your surgeon at your follow up appointment.   If confusion, altered mental status, fever, chest pain, shortness of breath, change in voice, new or worsening neck pain, vomiting, change or worsening of medical status, please come back to the emergency room, and in case of emergency call 911.

## 2017-10-02 NOTE — DISCHARGE NOTE ADULT - CARE PROVIDER_API CALL
Pernell Skelton), Surgery  [address] [place] [address]  Phone: [phone]  Fax: [phone]

## 2017-10-02 NOTE — DISCHARGE NOTE ADULT - NS AS ACTIVITY OBS
Do not make important decisions/Walking-Indoors allowed/Walking-Outdoors allowed/Stairs allowed/Showering allowed/Driving allowed/Do not drive or operate machinery/No Heavy lifting/straining

## 2017-10-02 NOTE — PROGRESS NOTE ADULT - ASSESSMENT
52 year old female s/p evacuation of neck hematoma doing well with no new problems. Will need to follow up with surgeon who performed the original surgery once discharged.

## 2017-10-02 NOTE — DISCHARGE NOTE ADULT - MEDICATION SUMMARY - MEDICATIONS TO TAKE
I will START or STAY ON the medications listed below when I get home from the hospital:    acetaminophen 325 mg oral tablet  -- 2 tab(s) by mouth every 6 hours, As needed, Mild Pain (1 - 3)  -- Indication: For pain    oxyCODONE 5 mg oral tablet  -- 1 tab(s) by mouth every 4 hours, As needed, Moderate Pain (4 - 6) MDD:6  -- Indication: For pain    amLODIPine 5 mg oral tablet  -- 1 tab(s) by mouth once a day at night  -- Indication: For as per pmd    Synthroid 88 mcg (0.088 mg) oral tablet  -- 1 tab(s) by mouth once a day in the morning  -- Indication: For as per pmd

## 2017-10-02 NOTE — PROGRESS NOTE ADULT - SUBJECTIVE AND OBJECTIVE BOX
HPI/OVERNIGHT EVENTS: Patient seen and examined. No acute events overnight. Reports that she is tolerating liquid diet with no trouble swallowing. Also reports that her voice is much improved.     MEDICATIONS  (STANDING):  lactated ringers. 1000 milliLiter(s) (125 mL/Hr) IV Continuous <Continuous>  amLODIPine   Tablet 5 milliGRAM(s) Oral daily  levothyroxine 88 MICROGram(s) Oral daily    MEDICATIONS  (PRN):  acetaminophen   Tablet. 650 milliGRAM(s) Oral every 6 hours PRN Mild Pain (1 - 3)  oxyCODONE    IR 5 milliGRAM(s) Oral every 4 hours PRN Moderate Pain (4 - 6)    Vital Signs Last 24 Hrs  T(C): 36.8 (02 Oct 2017 08:06), Max: 37.3 (01 Oct 2017 19:20)  T(F): 98.2 (02 Oct 2017 08:06), Max: 99.1 (01 Oct 2017 19:20)  HR: 68 (02 Oct 2017 08:06) (68 - 87)  BP: 137/89 (02 Oct 2017 08:06) (122/78 - 172/97)  BP(mean): --  RR: 17 (02 Oct 2017 08:06) (17 - 18)  SpO2: 98% (02 Oct 2017 08:06) (94% - 98%)    Constitutional: patient resting comfortably in bed, in no acute distress  HEENT: EOMI / PERRL b/l  Neck: No JVD, full ROM without pain; CLEMENCIA drain in place with minimal serosang output   Respiratory: respirations are unlabored, no accessory muscle use, no conversational dyspnea  Cardiovascular: regular rate & rhythm  Gastrointestinal: Abdomen soft, non-tender, non-distended, no rebound tenderness / guarding  Neurological: GCS: 15 (4/5/6). A&O x 3; no gross sensory / motor / coordination deficits  Psychiatric: Normal mood, normal affect  Musculoskeletal: Able to move extremities normally       I&O's Detail    01 Oct 2017 07:01  -  02 Oct 2017 07:00  --------------------------------------------------------  IN:    lactated ringers.: 2875 mL    Oral Fluid: 240 mL  Total IN: 3115 mL    OUT:    Drain: 90 mL    Voided: 850 mL  Total OUT: 940 mL    Total NET: 2175 mL          LABS:                        11.7   8.3   )-----------( 205      ( 02 Oct 2017 06:56 )             35.3     10-01    143  |  103  |  10.0  ----------------------------<  113  3.7   |  27.0  |  0.53    Ca    8.7      01 Oct 2017 14:41    TPro  7.9  /  Alb  4.4  /  TBili  0.4  /  DBili  x   /  AST  13  /  ALT  10  /  AlkPhos  68  09-30

## 2017-10-03 ENCOUNTER — APPOINTMENT (OUTPATIENT)
Dept: SURGICAL ONCOLOGY | Facility: CLINIC | Age: 52
End: 2017-10-03
Payer: COMMERCIAL

## 2017-10-03 VITALS
DIASTOLIC BLOOD PRESSURE: 85 MMHG | OXYGEN SATURATION: 98 % | SYSTOLIC BLOOD PRESSURE: 137 MMHG | TEMPERATURE: 98.6 F | RESPIRATION RATE: 15 BRPM | HEART RATE: 72 BPM | HEIGHT: 63.5 IN | BODY MASS INDEX: 27.12 KG/M2 | WEIGHT: 155 LBS

## 2017-10-03 DIAGNOSIS — E04.1 NONTOXIC SINGLE THYROID NODULE: ICD-10-CM

## 2017-10-03 PROCEDURE — 93005 ELECTROCARDIOGRAM TRACING: CPT

## 2017-10-03 PROCEDURE — 99024 POSTOP FOLLOW-UP VISIT: CPT

## 2017-10-03 PROCEDURE — 80053 COMPREHEN METABOLIC PANEL: CPT

## 2017-10-03 PROCEDURE — 83605 ASSAY OF LACTIC ACID: CPT

## 2017-10-03 PROCEDURE — 99285 EMERGENCY DEPT VISIT HI MDM: CPT | Mod: 25

## 2017-10-03 PROCEDURE — 96375 TX/PRO/DX INJ NEW DRUG ADDON: CPT | Mod: XU

## 2017-10-03 PROCEDURE — 96374 THER/PROPH/DIAG INJ IV PUSH: CPT | Mod: XU

## 2017-10-03 PROCEDURE — 85027 COMPLETE CBC AUTOMATED: CPT

## 2017-10-03 PROCEDURE — 36415 COLL VENOUS BLD VENIPUNCTURE: CPT

## 2017-10-03 PROCEDURE — 70491 CT SOFT TISSUE NECK W/DYE: CPT

## 2017-10-03 PROCEDURE — 86850 RBC ANTIBODY SCREEN: CPT

## 2017-10-03 PROCEDURE — 87040 BLOOD CULTURE FOR BACTERIA: CPT

## 2017-10-03 PROCEDURE — 86900 BLOOD TYPING SEROLOGIC ABO: CPT

## 2017-10-03 PROCEDURE — 80048 BASIC METABOLIC PNL TOTAL CA: CPT

## 2017-10-03 PROCEDURE — 86901 BLOOD TYPING SEROLOGIC RH(D): CPT

## 2017-10-03 RX ADMIN — AMLODIPINE BESYLATE 5 MILLIGRAM(S): 2.5 TABLET ORAL at 05:02

## 2017-10-03 RX ADMIN — Medication 88 MICROGRAM(S): at 05:01

## 2017-10-04 VITALS
WEIGHT: 179.24 LBS | HEART RATE: 82 BPM | RESPIRATION RATE: 18 BRPM | TEMPERATURE: 98 F | SYSTOLIC BLOOD PRESSURE: 125 MMHG | OXYGEN SATURATION: 100 % | DIASTOLIC BLOOD PRESSURE: 83 MMHG

## 2017-10-05 LAB
CULTURE RESULTS: SIGNIFICANT CHANGE UP
CULTURE RESULTS: SIGNIFICANT CHANGE UP
SPECIMEN SOURCE: SIGNIFICANT CHANGE UP
SPECIMEN SOURCE: SIGNIFICANT CHANGE UP
SURGICAL PATHOLOGY STUDY: SIGNIFICANT CHANGE UP

## 2017-10-09 ENCOUNTER — APPOINTMENT (OUTPATIENT)
Dept: SURGICAL ONCOLOGY | Facility: CLINIC | Age: 52
End: 2017-10-09
Payer: COMMERCIAL

## 2017-10-09 VITALS
BODY MASS INDEX: 27.12 KG/M2 | OXYGEN SATURATION: 100 % | TEMPERATURE: 98.2 F | DIASTOLIC BLOOD PRESSURE: 101 MMHG | SYSTOLIC BLOOD PRESSURE: 164 MMHG | RESPIRATION RATE: 15 BRPM | HEART RATE: 72 BPM | WEIGHT: 155 LBS | HEIGHT: 63.5 IN

## 2017-10-09 PROCEDURE — 99024 POSTOP FOLLOW-UP VISIT: CPT

## 2017-10-09 RX ORDER — MULTIVIT-MIN/IRON/FOLIC ACID/K 18-600-40
CAPSULE ORAL
Refills: 0 | Status: DISCONTINUED | COMMUNITY
End: 2017-10-09

## 2017-10-09 RX ORDER — LEVOTHYROXINE SODIUM 50 UG/1
50 TABLET ORAL
Qty: 90 | Refills: 0 | Status: DISCONTINUED | COMMUNITY
Start: 2017-03-31 | End: 2017-10-09

## 2017-10-09 RX ORDER — LEVOTHYROXINE SODIUM 0.07 MG/1
75 TABLET ORAL
Qty: 30 | Refills: 0 | Status: DISCONTINUED | COMMUNITY
Start: 2017-04-12 | End: 2017-10-09

## 2017-10-09 RX ORDER — UBIDECARENONE/VIT E ACET 100MG-5
CAPSULE ORAL
Refills: 0 | Status: DISCONTINUED | COMMUNITY
End: 2017-10-09

## 2017-10-09 RX ORDER — OXYCODONE 5 MG/1
5 TABLET ORAL
Qty: 6 | Refills: 0 | Status: DISCONTINUED | COMMUNITY
Start: 2017-10-02 | End: 2017-10-09

## 2017-10-12 ENCOUNTER — APPOINTMENT (OUTPATIENT)
Dept: SURGICAL ONCOLOGY | Facility: CLINIC | Age: 52
End: 2017-10-12
Payer: COMMERCIAL

## 2017-10-12 VITALS
HEART RATE: 67 BPM | DIASTOLIC BLOOD PRESSURE: 101 MMHG | OXYGEN SATURATION: 99 % | TEMPERATURE: 97.6 F | SYSTOLIC BLOOD PRESSURE: 161 MMHG | HEIGHT: 63.5 IN | RESPIRATION RATE: 16 BRPM | WEIGHT: 155 LBS | BODY MASS INDEX: 27.12 KG/M2

## 2017-10-12 PROCEDURE — 99024 POSTOP FOLLOW-UP VISIT: CPT

## 2017-10-17 ENCOUNTER — TRANSCRIPTION ENCOUNTER (OUTPATIENT)
Age: 52
End: 2017-10-17

## 2017-11-16 ENCOUNTER — APPOINTMENT (OUTPATIENT)
Dept: SURGICAL ONCOLOGY | Facility: CLINIC | Age: 52
End: 2017-11-16
Payer: COMMERCIAL

## 2017-11-16 VITALS
HEART RATE: 80 BPM | SYSTOLIC BLOOD PRESSURE: 129 MMHG | HEIGHT: 63.5 IN | DIASTOLIC BLOOD PRESSURE: 86 MMHG | WEIGHT: 155 LBS | BODY MASS INDEX: 27.12 KG/M2 | RESPIRATION RATE: 15 BRPM

## 2017-11-16 PROCEDURE — 99024 POSTOP FOLLOW-UP VISIT: CPT

## 2018-06-20 ENCOUNTER — RESULT REVIEW (OUTPATIENT)
Age: 53
End: 2018-06-20

## 2018-07-12 ENCOUNTER — APPOINTMENT (OUTPATIENT)
Dept: SURGICAL ONCOLOGY | Facility: CLINIC | Age: 53
End: 2018-07-12
Payer: COMMERCIAL

## 2018-07-12 VITALS
SYSTOLIC BLOOD PRESSURE: 142 MMHG | WEIGHT: 155 LBS | BODY MASS INDEX: 27.12 KG/M2 | HEART RATE: 67 BPM | DIASTOLIC BLOOD PRESSURE: 91 MMHG | HEIGHT: 63.5 IN | OXYGEN SATURATION: 99 %

## 2018-07-12 PROCEDURE — 99213 OFFICE O/P EST LOW 20 MIN: CPT

## 2018-12-11 NOTE — CONSULT NOTE ADULT - SUBJECTIVE AND OBJECTIVE BOX
Advised that he should be seen first to evaluate the area of concern.  Appointment scheduled with NP.    52F POD #1 s/p left partial thyroidectomy presents to [facility] c/o pain in left neck and numbness of left side of lips.  Patient reports she had surgery at [facility] yesterday and that the pain had become severe so she presented to [facility].  Reports SOB, difficulty swallowing, difficulty breathing.  Denies fever, chills.      VSS  SR 89  /76  O2 sat 96% RA  Gen:  A&Ox3  Pulm:  CTA b/l no r/r/w/stridor no crepitus  Abd:  obese soft NT ND  HEENT:  face and neck swelling, tongue swelling, steris in place anterior neck, tense, palpable nodes    Imaging:  CT angio neck:  unofficial read from ED attending was "abscesses."  Called and spoke to [facility] radiology dept.  Patient with significant consolidation, air pockets, and extravasation of contrast.  Suspicious for active hemorrhage.

## 2019-06-27 ENCOUNTER — RESULT REVIEW (OUTPATIENT)
Age: 54
End: 2019-06-27

## 2019-07-18 ENCOUNTER — APPOINTMENT (OUTPATIENT)
Dept: SURGICAL ONCOLOGY | Facility: CLINIC | Age: 54
End: 2019-07-18
Payer: COMMERCIAL

## 2019-07-18 VITALS
SYSTOLIC BLOOD PRESSURE: 155 MMHG | HEART RATE: 67 BPM | DIASTOLIC BLOOD PRESSURE: 96 MMHG | WEIGHT: 150 LBS | OXYGEN SATURATION: 97 % | BODY MASS INDEX: 26.25 KG/M2 | RESPIRATION RATE: 15 BRPM | HEIGHT: 63.5 IN

## 2019-07-18 PROCEDURE — 99214 OFFICE O/P EST MOD 30 MIN: CPT

## 2019-07-19 PROBLEM — N63 UNSPECIFIED LUMP IN BREAST: Chronic | Status: ACTIVE | Noted: 2017-09-20

## 2019-07-19 PROBLEM — E07.9 DISORDER OF THYROID, UNSPECIFIED: Chronic | Status: ACTIVE | Noted: 2017-09-20

## 2019-07-19 PROBLEM — R06.83 SNORING: Chronic | Status: ACTIVE | Noted: 2017-09-20

## 2019-07-19 PROBLEM — I10 ESSENTIAL (PRIMARY) HYPERTENSION: Chronic | Status: ACTIVE | Noted: 2017-09-20

## 2019-07-19 PROBLEM — I83.90 ASYMPTOMATIC VARICOSE VEINS OF UNSPECIFIED LOWER EXTREMITY: Chronic | Status: ACTIVE | Noted: 2017-09-20

## 2019-07-19 PROBLEM — E05.90 THYROTOXICOSIS, UNSPECIFIED WITHOUT THYROTOXIC CRISIS OR STORM: Chronic | Status: ACTIVE | Noted: 2017-09-20

## 2019-08-02 NOTE — PROGRESS NOTE ADULT - SUBJECTIVE AND OBJECTIVE BOX
Mailed results note below to patient.      HPI/OVERNIGHT EVENTS: Patient seen and examined at bedside this AM. No acute events overnight. Was NPO all night. No stridor or dysphagia present. No sign of expanding hematoma present.     MEDICATIONS  (STANDING):  lactated ringers. 1000 milliLiter(s) (125 mL/Hr) IV Continuous <Continuous>  amLODIPine   Tablet 5 milliGRAM(s) Oral daily  levothyroxine 88 MICROGram(s) Oral daily    MEDICATIONS  (PRN):  acetaminophen   Tablet. 650 milliGRAM(s) Oral every 6 hours PRN Mild Pain (1 - 3)  oxyCODONE    IR 5 milliGRAM(s) Oral every 4 hours PRN Moderate Pain (4 - 6)      Vital Signs Last 24 Hrs  T(C): 37.3 (01 Oct 2017 19:20), Max: 37.3 (01 Oct 2017 19:20)  T(F): 99.1 (01 Oct 2017 19:20), Max: 99.1 (01 Oct 2017 19:20)  HR: 87 (01 Oct 2017 19:20) (69 - 87)  BP: 159/93 (01 Oct 2017 20:20) (112/77 - 172/97)  BP(mean): --  RR: 18 (01 Oct 2017 19:20) (12 - 20)  SpO2: 94% (01 Oct 2017 19:20) (94% - 100%)    Constitutional: patient resting comfortably in bed, in no acute distress  HEENT: EOMI / PERRL b/l  Neck: No JVD; CLEMENCIA drain in place with serous output present   Respiratory: respirations are unlabored, no accessory muscle use, no conversational dyspnea  Cardiovascular: regular rate & rhythm  Gastrointestinal: Abdomen soft, non-tender, non-distended, no rebound tenderness / guarding  Neurological: GCS: 15 (4/5/6). A&O x 3  Psychiatric: Normal mood, normal affect  Musculoskeletal: Able to move extremities normally       I&O's Detail    30 Sep 2017 07:01  -  01 Oct 2017 07:00  --------------------------------------------------------  IN:    lactated ringers.: 400 mL  Total IN: 400 mL    OUT:    Drain: 40 mL  Total OUT: 40 mL    Total NET: 360 mL      01 Oct 2017 07:01  -  01 Oct 2017 22:33  --------------------------------------------------------  IN:    lactated ringers.: 1375 mL    Oral Fluid: 240 mL  Total IN: 1615 mL    OUT:    Drain: 40 mL    Voided: 850 mL  Total OUT: 890 mL    Total NET: 725 mL    LABS:                        12.6   11.2  )-----------( 209      ( 01 Oct 2017 14:41 )             37.9     10-01    143  |  103  |  10.0  ----------------------------<  113  3.7   |  27.0  |  0.53    Ca    8.7      01 Oct 2017 14:41    TPro  7.9  /  Alb  4.4  /  TBili  0.4  /  DBili  x   /  AST  13  /  ALT  10  /  AlkPhos  68  09-30

## 2020-01-02 NOTE — ASU PATIENT PROFILE, ADULT - PAIN SCALE PREFERRED, PROFILE
none Hatchet Flap Text: The defect edges were debeveled with a #15 scalpel blade.  Given the location of the defect, shape of the defect and the proximity to free margins a hatchet flap was deemed most appropriate.  Using a sterile surgical marker, an appropriate hatchet flap was drawn incorporating the defect and placing the expected incisions within the relaxed skin tension lines where possible.    The area thus outlined was incised deep to adipose tissue with a #15 scalpel blade.  The skin margins were undermined to an appropriate distance in all directions utilizing iris scissors.

## 2020-04-07 NOTE — PHYSICAL EXAM
[Normal Neck Lymph Nodes] : normal neck lymph nodes  [Normal Supraclavicular Lymph Nodes] : normal supraclavicular lymph nodes [Normal Axillary Lymph Nodes] : normal axillary lymph nodes [Normal] : normal appearance, no rash, nodules, vesicles, ulcers, erythema [de-identified] : Below [de-identified] : Groins not examined

## 2020-04-07 NOTE — ASSESSMENT
[FreeTextEntry1] : Clinically she continues to do well.\par \par Imaging as orchestrated to her endocrinologist.\par \par I've asked to see her in another year, sooner if needed

## 2020-04-07 NOTE — HISTORY OF PRESENT ILLNESS
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 Gilma HARRISON, has placed her on suppressive therapy. \par Completion total thyroidectomy - NOT recommended by her.\par June 2019 visit was ok.\par \par Went home on the same day as surgery.\par BUT, POD#1 she developed pain and tingling on the left side of her face and swelling in the neck. She was taken emergently to [facility], and and underwent exploration and evacuation of hematoma.\par I spoke with the attending surgeon, Dr. Cowart.\par She reported that all of our clips were secure and in their proper location. No bleeding was identified, despite Valsalva maneuvers. The wound was closed primarily, with the drain left in the operative bed.\par \par Index visiti: September 2017\par She was referred by one of her relatives who is one of the nurses from the operating room at [place] with suspicious cytology on fine needle aspiration of a 1.9 cm solid nodule in the mid left lobe of her thyroid.\par \par This was an asymptomatic lesion which has been followed sonographically.\par It had not changed significantly on sonography from 2014 to 2017, at \par BUT, because she changed her endocrinologists Richard) a needle biopsy was performed providing the above diagnosis.\par \par In 2012 a biopsy of this area had been benign.\par \par No previous personal history of malignancy.\par \par No family history of thyroid cancer.\par Her father's thyroid was removed because it was enlarged.\par \par She has had no radiation exposure that she knows of.\par \par \par Her internist is Dr. Ángel FOSTER.\par \par She does not have a pacemaker or defibrillator.\par She takes no anticoagulants.\par \par +synthroid\par Amlodipine for hypertension.\par \par \par She last saw her gynecologist Dr. Ronda Bateman in the spring of 2017.\par NOW follows-up with Gilma HARRISON\par \par Mammography in June 2019, at , was okay.\par +FH:\par A sister had breast cancer at age 40, but did not have genetic testing\par \par \par Baseline colonoscopy:\par Dr Radha Fernandez Fall 2018: ok x 3 yr; benign polypectomy

## 2020-06-30 ENCOUNTER — RESULT REVIEW (OUTPATIENT)
Age: 55
End: 2020-06-30

## 2020-07-16 ENCOUNTER — APPOINTMENT (OUTPATIENT)
Dept: SURGICAL ONCOLOGY | Facility: CLINIC | Age: 55
End: 2020-07-16
Payer: COMMERCIAL

## 2020-07-16 VITALS
WEIGHT: 136 LBS | DIASTOLIC BLOOD PRESSURE: 88 MMHG | BODY MASS INDEX: 23.8 KG/M2 | HEART RATE: 65 BPM | HEIGHT: 63.5 IN | OXYGEN SATURATION: 98 % | SYSTOLIC BLOOD PRESSURE: 149 MMHG

## 2020-07-16 VITALS — TEMPERATURE: 98.1 F

## 2020-07-16 PROCEDURE — 99213 OFFICE O/P EST LOW 20 MIN: CPT

## 2020-07-16 NOTE — REASON FOR VISIT
[Follow-Up Visit] : a follow-up visit for [Other: _____] : [unfilled] [FreeTextEntry2] : History of thyroid cancer

## 2020-07-16 NOTE — PHYSICAL EXAM
[Normal Neck Lymph Nodes] : normal neck lymph nodes  [Normal Supraclavicular Lymph Nodes] : normal supraclavicular lymph nodes [Normal Axillary Lymph Nodes] : normal axillary lymph nodes [Normal] : normal appearance, no rash, nodules, vesicles, ulcers, erythema [de-identified] : Below [de-identified] : Groins not examined

## 2020-07-16 NOTE — HISTORY OF PRESENT ILLNESS
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 Gilma HARRISON, has placed her on suppressive therapy. \par Completion total thyroidectomy - NOT recommended by her.\par June 2020 visit was ok.\par \par Went home on the same day as surgery.\par BUT, POD#1 she developed pain and tingling on the left side of her face and swelling in the neck. She was taken emergently to [facility], and and underwent exploration and evacuation of hematoma.\par I spoke with the attending surgeon, Dr. Cowart.\par She reported that all of our clips were secure and in their proper location. No bleeding was identified, despite Valsalva maneuvers. \par The wound was closed primarily, with the drain left in the operative bed.\par \par Index visiti: September 2017\par She was referred by one of her relatives who is one of the nurses from the operating room at [place] with suspicious cytology on fine needle aspiration of a 1.9 cm solid nodule in the mid left lobe of her thyroid.\par \par This was an asymptomatic lesion which has been followed sonographically.\par It had not changed significantly on sonography from 2014 to 2017, at \par BUT, because she changed her endocrinologists Richard) a needle biopsy was performed providing the above diagnosis.\par \par In 2012 a biopsy of this area had been benign.\par \par No previous personal history of malignancy.\par \par No family history of thyroid cancer.\par Her father's thyroid was removed because it was enlarged.\par \par She has had no radiation exposure that she knows of.\par \par \par Her internist is Dr. Ángel FOSTER, and Ophelia BUCKLEY.\par \par She does not have a pacemaker or defibrillator.\par She takes no anticoagulants.\par \par +synthroid\par Amlodipine for hypertension.\par \par \par She last saw her gynecologist Dr. Ronda Bateman in the spring of 2017.\par NOW follows-up with Gilma HARRISON\par \par Mammography in June 2019, at , was okay.\par +FH:\par A sister had breast cancer at age 40, but did not have genetic testing\par She has a prescription for 2020 imaging from her gynecologist.\par \par \par Baseline colonoscopy:\par Dr Radha Fernandez Fall 2018: ok x 3 yr; benign polypectomy

## 2020-12-21 NOTE — ED ADULT NURSE NOTE - CAS TRG GEN SKIN COLOR
There are no preventive care reminders to display for this patient. Patient is up to date, no discussion needed. Pink

## 2021-02-12 ENCOUNTER — TRANSCRIPTION ENCOUNTER (OUTPATIENT)
Age: 56
End: 2021-02-12

## 2021-07-19 ENCOUNTER — APPOINTMENT (OUTPATIENT)
Dept: SURGICAL ONCOLOGY | Facility: CLINIC | Age: 56
End: 2021-07-19
Payer: COMMERCIAL

## 2021-07-19 VITALS
SYSTOLIC BLOOD PRESSURE: 167 MMHG | DIASTOLIC BLOOD PRESSURE: 101 MMHG | HEIGHT: 63.5 IN | RESPIRATION RATE: 17 BRPM | BODY MASS INDEX: 25.37 KG/M2 | WEIGHT: 145 LBS | HEART RATE: 64 BPM | OXYGEN SATURATION: 98 %

## 2021-07-19 PROCEDURE — 99214 OFFICE O/P EST MOD 30 MIN: CPT

## 2021-07-19 PROCEDURE — 99072 ADDL SUPL MATRL&STAF TM PHE: CPT

## 2021-07-19 NOTE — PHYSICAL EXAM
[Normal Neck Lymph Nodes] : normal neck lymph nodes  [Normal Supraclavicular Lymph Nodes] : normal supraclavicular lymph nodes [Normal Axillary Lymph Nodes] : normal axillary lymph nodes [Normal] : normal appearance, no rash, nodules, vesicles, ulcers, erythema [de-identified] : Below [de-identified] : Groins not examined

## 2021-11-03 NOTE — H&P PST ADULT - HEART RATE (BEATS/MIN)
[Family Member] : family member [Initial Visit ___] : [unfilled] is here today for an initial visit  for [unfilled] 65

## 2022-07-24 PROBLEM — C73 THYROID CANCER: Status: ACTIVE | Noted: 2017-10-09

## 2022-07-24 RX ORDER — LEVOTHYROXINE SODIUM 100 UG/1
100 TABLET ORAL
Qty: 90 | Refills: 0 | Status: ACTIVE | COMMUNITY
Start: 2022-06-30

## 2022-07-24 RX ORDER — HYDROCHLOROTHIAZIDE 12.5 MG/1
12.5 TABLET ORAL
Qty: 90 | Refills: 0 | Status: ACTIVE | COMMUNITY
Start: 2022-06-30

## 2022-07-25 ENCOUNTER — APPOINTMENT (OUTPATIENT)
Dept: SURGICAL ONCOLOGY | Facility: CLINIC | Age: 57
End: 2022-07-25

## 2022-07-25 VITALS
WEIGHT: 145 LBS | OXYGEN SATURATION: 96 % | BODY MASS INDEX: 25.37 KG/M2 | SYSTOLIC BLOOD PRESSURE: 144 MMHG | RESPIRATION RATE: 16 BRPM | TEMPERATURE: 98.2 F | HEART RATE: 71 BPM | DIASTOLIC BLOOD PRESSURE: 87 MMHG | HEIGHT: 63.5 IN

## 2022-07-25 DIAGNOSIS — C73 MALIGNANT NEOPLASM OF THYROID GLAND: ICD-10-CM

## 2022-07-25 PROCEDURE — 99214 OFFICE O/P EST MOD 30 MIN: CPT

## 2022-07-25 NOTE — PHYSICAL EXAM
[Normal Neck Lymph Nodes] : normal neck lymph nodes  [Normal Supraclavicular Lymph Nodes] : normal supraclavicular lymph nodes [Normal Axillary Lymph Nodes] : normal axillary lymph nodes [Normal] : normal appearance, no rash, nodules, vesicles, ulcers, erythema [de-identified] : Below [de-identified] : Groins not examined

## 2022-07-25 NOTE — HISTORY OF PRESENT ILLNESS
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 Gilma RAMOS, has placed her on suppressive therapy. \par Completion total thyroidectomy - NOT recommended by her.\par 2021 visit was ok.\par \par \par 2021:\par She reported a 6-month history (since the beginning of ) of a sore throat, more on the right than on the left.\par No preceding injury or instigating factor that she is aware of.\par No provocative or palliative factors.\par Neck ultrasound examinations have been normal.\par No other specific or constitutional signs or symptoms.\par The symptoms were self-limited, have resolved, and did not recur\par \par She also reports intermittent foot pain.\par I suggested podiatry evaluation, but she prefers to manage it herself presently\par \par \par Went home on the same day as surgery.\par BUT, POD#1 she developed pain and tingling on the left side of her face and swelling in the neck. \par She was taken emergently to [facility], and and had exploration and evacuation of hematoma.\par I spoke with the attending surgeon, Dr. Cowart.\par She reported that all of our clips were secure and in their proper location. \par No bleeding was identified, despite Valsalva maneuvers. \par The wound was closed primarily, with the drain left in the operative bed.\par \par \par Index visit: 2017\par She was referred by one of her relatives who is a nurse from the operating room at [facility].\par She had suspicious cytology on fine needle aspiration of a 1.9 cm solid nodule in the mid left lobe of her thyroid.\par \par This was an asymptomatic lesion which was followed sonographically.\par It had not changed significantly on sonography from  to , at \par BUT, because she changed her endocrinologists (Dr. Gilma Ramos) a needle biopsy was performed providing the above diagnosis.\par \par In  a biopsy of this area had been benign.\par \par No previous personal history of malignancy.\par \par No family history of thyroid cancer.\par Her father's thyroid was removed because it was enlarged.\par \par She has had no radiation exposure that she knows of.\par \par Family history of malignancy:\par A sister had breast cancer at 40 (below).\par No other relatives with a history of malignancy\par \par \par Her internists are Dr. Ángel FOSTER, and Ophelia BUCKLEY.\par \par She does not have a pacemaker or defibrillator.\par She takes no anticoagulants.\par \par +Synthroid\par + Thyroid cancer, left thyroid lobectomy.\par Endocrinology: Dr. Gilma RAMOS.\par 2022 visit was unremarkable\par \par Amlodipine for hypertension.  \par she does not see a cardiologist\par \par \par She last saw her gynecologist Dr. Ronda Bateman in the spring of 2017.\par NOW follows-up with Gilma RAMOS.\par \par Menarche at 14.\par  2, para 2, first at 27.\par Natural menopause at 50.\par No HRT\par \par Mammography and breast ultrasound 2021, at , were okay.\par +FH:\par A sister had breast cancer at age 40, but did not have genetic testing.\par \par Patient's breast cancer risk score =13.4%\par \par Prescription for 2021 breast imaging provided at her last visit.\par \par \par Baseline colonoscopy:\par Dr Radha Fernandez 2018: ok x 3 yr; benign polypectomy.\par 2021:\par Colonoscopy (Dr. Raheem DARLING) okay x5 years, 2026........................

## 2022-07-25 NOTE — REVIEW OF SYSTEMS
[Negative] : Endocrine [FreeTextEntry4] : Thyroid cancer [FreeTextEntry5] : Hypertension [FreeTextEntry8] : Benign colon polyps [FreeTextEntry1] : Family history of breast cancer

## 2023-10-17 NOTE — H&P PST ADULT - PROBLEM SELECTOR PLAN 2
Notified OR booking via fax of MAURICIO precautions Finasteride Pregnancy And Lactation Text: This medication is absolutely contraindicated during pregnancy. It is unknown if it is excreted in breast milk.

## 2024-01-06 NOTE — HISTORY OF PRESENT ILLNESS
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 Gilma RAMOS, has placed her on suppressive therapy. \par Completion total thyroidectomy - NOT recommended by her.\par 2021 visit was ok.\par \par \par Today, she reports a 6-month history (since the beginning of ) of a sore throat, more on the right than on the left.\par No preceding injury or instigating factor that she is aware of.\par No provocative or palliative factors.\par Neck ultrasound examinations have been normal.\par No other specific or constitutional signs or symptoms.\par I suggested an ENT evaluation, and provided her with the contact information for Dr. Rio Kumari, for possible laryngoscopy.\par \par She also reports intermittent foot pain.\par She asked for recommendation for podiatry, I suggested Dr. Renetta DEL ANGEL.\par \par \par Went home on the same day as surgery.\par BUT, POD#1 she developed pain and tingling on the left side of her face and swelling in the neck. \par She was taken emergently to [facility], and and had exploration and evacuation of hematoma.\par I spoke with the attending surgeon, Dr. Cowart.\par She reported that all of our clips were secure and in their proper location. \par No bleeding was identified, despite Valsalva maneuvers. \par The wound was closed primarily, with the drain left in the operative bed.\par \par Index visiti: 2017\par She was referred by one of her relatives who is one of the nurses from the operating room at [facility] [facility].\par She had suspicious cytology on fine needle aspiration of a 1.9 cm solid nodule in the mid left lobe of her thyroid.\par \par This was an asymptomatic lesion which was followed sonographically.\par It had not changed significantly on sonography from  to 2017, at \par BUT, because she changed her endocrinologists (Dr. Gilma Ramos) a needle biopsy was performed providing the above diagnosis.\par \par In  a biopsy of this area had been benign.\par \par No previous personal history of malignancy.\par \par No family history of thyroid cancer.\par Her father's thyroid was removed because it was enlarged.\par \par She has had no radiation exposure that she knows of.\par \par Family history of malignancy:\par A sister had breast cancer at 40 (below).\par No other relatives with a history of malignancy\par \par \par Her internists are Dr. Ángel FOSTER, and Ophelia BUCKLEY.\par \par She does not have a pacemaker or defibrillator.\par She takes no anticoagulants.\par \par +Synthroid\par + Thyroid cancer, left thyroid lobectomy.\par Endocrinology: Dr. Gilma Ramos.\par \par Amlodipine for hypertension.  \par she does not see a cardiologist\par \par \par She last saw her gynecologist Dr. Ronda Bateman in the spring of 2017.\par NOW follows-up with Gilma RAMOS.\par \par Menarche at 14.\par  2, para 2, first at 27.\par Natural menopause at 50.\par No HRT\par \par Mammography in 2020, at , was okay.\par +FH:\par A sister had breast cancer at age 40, but did not have genetic testing.\par \par Patient's breast cancer risk score =13.4%\par \par Prescription for 2021 breast imaging provided.\par \par \par Baseline colonoscopy:\par Dr Radha Fernandez 2018: ok x 3 yr; benign polypectomy.\par She is aware that she is due for a follow-up colonoscopy presently.\par She may change to Dr. Raheem Garnica. Patient/Caregiver provided printed discharge information.

## 2024-03-21 NOTE — ED PROVIDER NOTE - MOUTH NORMAL
Chief Complaint   Patient presents with    Consult     New Vasculitis     Medications reviewed and vital signs taken.   Glen Bojorquez CMA      normal mucosa

## 2024-06-05 NOTE — DISCHARGE NOTE ADULT - PROVIDER TOKENS
In an effort to ensure that our patients [employer], a Team Member has reviewed your chart and identified an opportunity to provide the best care possible.     The Outcome was Contact was not made, letter/portal message sent.  We are attempting to schedule a follow up office visit. If you have any questions or need help with scheduling, contact your primary care provider.. Care Gaps identified: Cervical Cancer Screening.    Appointment needed: Follow-up Visit for PAP    
PAPA:'[id number]'

## 2024-08-14 NOTE — H&P PST ADULT - NEGATIVE OPHTHALMOLOGIC SYMPTOMS
Patient states she had a fall in April 2024. Patient states she fell onto her left cheek/face. Patient states it healed up and looks normal. Patient states she continues to have intermittent pain to her left cheek. Patient states she has pain with palpation and when she yawns or has a big smile. Patient states the pain is minimal and rates her pain 2/10. Patient is able to open/close her mouth fully without difficulty. Patient has been to the Dentist and the numbing medication did not work even after 6 attempts to numb her mouth. Patient is concerned she may have some nerve damage or some other issue as they did not take any xrays with the initial injury. Patient afebrile. Patient alert and answering questions appropriately, Pleasant affect.    Patient denies fever, swelling, redness, difficulty eating/drinking, neck pain, mouth pain, numbness/tingling, headache.    Per protocol Patient should be seen within 48 hours. Patient given care advice and reasons to call back per protocol. Patient verbalized understanding. No further questions.      Patient scheduled to see No Frost NP tomorrow at 415pm.    Routing to PCP and Evaluating Provider for FYI.   no diplopia/no photophobia

## 2024-09-15 NOTE — DISCHARGE NOTE ADULT - CLICK TO LAUNCH ORM
Trauma Surgery - End of Shift Nursing Note:  Shift timeframe 3194-0228    Pain Management  Last pain level: 2/10  What pain medications were given tylenol, oxycodone  Did the patient receive IV Pain Medication No    Respiratory Assessment:  Supplemental oxygen No  Incentive Spirometry refused    GI Assessment:   Nausea No  Emesis No  Flatus yes  Bowel movement No     Assessment:  Voiding Yes    Bladder scan No  If yes Straight Cath No  Villa No    Activity:  Times out of bed up ad wood  Delirium No  IF yes, please describe to include if a CAM assessment was completed, or a change was noted in the mental status assessment.    Patient Education:   Patient provided trauma folder if applicable Yes  Incentive Spirometer No - patient refused    Any Unforeseen Events? none     .